# Patient Record
Sex: MALE | Race: BLACK OR AFRICAN AMERICAN | Employment: FULL TIME | URBAN - METROPOLITAN AREA
[De-identification: names, ages, dates, MRNs, and addresses within clinical notes are randomized per-mention and may not be internally consistent; named-entity substitution may affect disease eponyms.]

---

## 2024-03-05 ENCOUNTER — APPOINTMENT (OUTPATIENT)
Dept: GENERAL RADIOLOGY | Age: 34
End: 2024-03-05
Payer: COMMERCIAL

## 2024-03-05 ENCOUNTER — HOSPITAL ENCOUNTER (EMERGENCY)
Age: 34
Discharge: HOME | End: 2024-03-05
Attending: EMERGENCY MEDICINE
Payer: COMMERCIAL

## 2024-03-05 VITALS
HEART RATE: 84 BPM | OXYGEN SATURATION: 100 % | SYSTOLIC BLOOD PRESSURE: 149 MMHG | DIASTOLIC BLOOD PRESSURE: 85 MMHG | TEMPERATURE: 98.2 F | RESPIRATION RATE: 18 BRPM

## 2024-03-05 DIAGNOSIS — S93.401A SPRAIN OF RIGHT ANKLE, UNSPECIFIED LIGAMENT, INITIAL ENCOUNTER: Primary | ICD-10-CM

## 2024-03-05 DIAGNOSIS — R03.0 ELEVATED BLOOD PRESSURE READING: ICD-10-CM

## 2024-03-05 PROCEDURE — 99283 EMERGENCY DEPT VISIT LOW MDM: CPT

## 2024-03-05 PROCEDURE — 73610 X-RAY EXAM OF ANKLE: CPT

## 2024-03-05 RX ORDER — NAPROXEN 375 MG/1
375 TABLET ORAL 2 TIMES DAILY WITH MEALS
Qty: 14 TABLET | Refills: 0 | Status: SHIPPED | OUTPATIENT
Start: 2024-03-05

## 2024-03-05 RX ORDER — ACETAMINOPHEN 500 MG
500 TABLET ORAL EVERY 6 HOURS PRN
Qty: 30 TABLET | Refills: 0 | Status: SHIPPED | OUTPATIENT
Start: 2024-03-05

## 2024-03-05 ASSESSMENT — PAIN DESCRIPTION - PAIN TYPE: TYPE: ACUTE PAIN

## 2024-03-05 ASSESSMENT — PAIN DESCRIPTION - ORIENTATION: ORIENTATION: RIGHT

## 2024-03-05 ASSESSMENT — PAIN SCALES - GENERAL: PAINLEVEL_OUTOF10: 7

## 2024-03-05 ASSESSMENT — PAIN - FUNCTIONAL ASSESSMENT: PAIN_FUNCTIONAL_ASSESSMENT: 0-10

## 2024-03-05 ASSESSMENT — PAIN DESCRIPTION - LOCATION: LOCATION: ANKLE

## 2024-03-05 ASSESSMENT — PAIN DESCRIPTION - DESCRIPTORS: DESCRIPTORS: STABBING

## 2024-03-05 NOTE — ED PROVIDER NOTES
When ordered only abnormal lab results are displayed. All other labs were within normal range or not returned as of this dictation.     EKG:      RADIOLOGY:    Non-plain film images such as CT, Ultrasound and MRI are read by the radiologist. Plain radiographic images are visualized and preliminarily interpreted by the ED Provider with the below findings:      Interpretation per the Radiologist below, if available at the time of this note:  No results found.         EMERGENCY DEPARTMENT COURSE and DIFFERENTIAL DIAGNOSIS/MDM:     Vitals:    Vitals:    03/05/24 1111 03/05/24 1113   BP:  (!) 149/85   Pulse: 84    Resp: 18    Temp: 98.2 °F (36.8 °C)    TempSrc: Oral    SpO2: 100%         Patient was given the following medications:   Medications - No data to display          CC/HPI Summary, DDx, ED Course, and Reassessment: The patient was seen and evaluated.  At this time I find no evidence of fibular fracture, the patient has no pain at the base of the fifth metatarsal, the Achilles is nontender.  Radiology has read the x-ray as no fracture or dislocation.  At this time I feel the patient has an ankle sprain.  He will be treated symptomatically.  He was given an Aircast in the ED.    He was advised of elevated blood pressure and given information for the [facility] to get follow-up in 1 week.    CONSULTS: None   Social Determinants: None   Chronic Conditions: NA    Disposition Considerations: None      I am the primary physician of Record.     FINAL IMPRESSION    1. Sprain of right ankle, unspecified ligament, initial encounter    2. Elevated blood pressure reading         DISPOSITION/PLAN   DISPOSITION Decision To Discharge 03/05/2024 11:32:40 AM       PATIENT REFERRED TO:   [facility]  [address]  [facility] [address]  [phone]    Schedule an appointment as soon as possible for a visit in 1 week  For Blood Pressure recheck     DISCHARGE MEDICATIONS:   New  Prescriptions    ACETAMINOPHEN (TYLENOL) 500 MG TABLET    Take 1 tablet by mouth every 6 hours as needed for Pain    NAPROXEN (NAPROSYN) 375 MG TABLET    Take 1 tablet by mouth 2 times daily (with meals)      DISCONTINUED MEDICATIONS:   Discontinued Medications    No medications on file            (Please note that portions of this note were completed with a voice recognition program.  Efforts were made to edit the dictations but occasionally words are mis-transcribed.)     Kevyn Muniz MD (electronically signed)         Kevyn Muniz MD  03/05/24 1390

## 2024-03-05 NOTE — ED NOTES
Ankle brace placed on RLE as ordered. Pt educated on proper use and demonstrated understanding. AVS reviewed and all questions answered. Pt taken from ED to lobby via wheelchair.